# Patient Record
Sex: FEMALE | Race: WHITE | NOT HISPANIC OR LATINO | Employment: FULL TIME | URBAN - METROPOLITAN AREA
[De-identification: names, ages, dates, MRNs, and addresses within clinical notes are randomized per-mention and may not be internally consistent; named-entity substitution may affect disease eponyms.]

---

## 2023-07-27 RX ORDER — NORGESTIMATE AND ETHINYL ESTRADIOL 7DAYSX3 LO
1 KIT ORAL DAILY
Qty: 84 TABLET | Refills: 0 | OUTPATIENT
Start: 2023-07-27

## 2023-07-31 ENCOUNTER — TELEPHONE (OUTPATIENT)
Dept: PRIMARY CARE | Facility: CLINIC | Age: 48
End: 2023-07-31

## 2023-07-31 DIAGNOSIS — Z30.41 ENCOUNTER FOR SURVEILLANCE OF CONTRACEPTIVE PILLS: Primary | ICD-10-CM

## 2023-07-31 RX ORDER — NORGESTIMATE AND ETHINYL ESTRADIOL 7DAYSX3 28
1 KIT ORAL DAILY
Qty: 28 TABLET | Refills: 0 | Status: SHIPPED | OUTPATIENT
Start: 2023-07-31 | End: 2023-10-26 | Stop reason: ALTCHOICE

## 2023-07-31 NOTE — TELEPHONE ENCOUNTER
Pt is requesting refill of her birth control. I scheduled her for 8/28 for her physical because she was due. She will need at least a month to last her until then

## 2023-10-25 ENCOUNTER — TELEPHONE (OUTPATIENT)
Dept: PRIMARY CARE | Facility: CLINIC | Age: 48
End: 2023-10-25

## 2023-10-25 NOTE — TELEPHONE ENCOUNTER
Patient thinks she is going through menopause. She is having night sweats. Last period was Aug 25th.

## 2023-10-26 ENCOUNTER — OFFICE VISIT (OUTPATIENT)
Dept: PRIMARY CARE | Facility: CLINIC | Age: 48
End: 2023-10-26
Payer: COMMERCIAL

## 2023-10-26 VITALS
BODY MASS INDEX: 28.45 KG/M2 | SYSTOLIC BLOOD PRESSURE: 142 MMHG | TEMPERATURE: 97.7 F | HEIGHT: 66 IN | DIASTOLIC BLOOD PRESSURE: 89 MMHG | OXYGEN SATURATION: 98 % | WEIGHT: 177 LBS | HEART RATE: 109 BPM

## 2023-10-26 DIAGNOSIS — R23.2 VASOMOTOR FLUSHING: Primary | ICD-10-CM

## 2023-10-26 DIAGNOSIS — Z11.59 NEED FOR HEPATITIS C SCREENING TEST: ICD-10-CM

## 2023-10-26 DIAGNOSIS — Z00.00 HEALTHCARE MAINTENANCE: ICD-10-CM

## 2023-10-26 PROBLEM — E55.9 VITAMIN D DEFICIENCY: Status: ACTIVE | Noted: 2023-10-26

## 2023-10-26 PROCEDURE — 99214 OFFICE O/P EST MOD 30 MIN: CPT | Performed by: FAMILY MEDICINE

## 2023-10-26 PROCEDURE — 1036F TOBACCO NON-USER: CPT | Performed by: FAMILY MEDICINE

## 2023-10-26 ASSESSMENT — ENCOUNTER SYMPTOMS
FATIGUE: 0
UNEXPECTED WEIGHT CHANGE: 0
SHORTNESS OF BREATH: 0
RHINORRHEA: 0
ABDOMINAL PAIN: 0
COUGH: 0
PALPITATIONS: 0

## 2023-10-26 ASSESSMENT — LIFESTYLE VARIABLES: HOW OFTEN DO YOU HAVE A DRINK CONTAINING ALCOHOL: 2-4 TIMES A MONTH

## 2023-10-26 ASSESSMENT — PATIENT HEALTH QUESTIONNAIRE - PHQ9
1. LITTLE INTEREST OR PLEASURE IN DOING THINGS: NOT AT ALL
2. FEELING DOWN, DEPRESSED OR HOPELESS: NOT AT ALL
SUM OF ALL RESPONSES TO PHQ9 QUESTIONS 1 AND 2: 0

## 2023-10-26 ASSESSMENT — COLUMBIA-SUICIDE SEVERITY RATING SCALE - C-SSRS
2. HAVE YOU ACTUALLY HAD ANY THOUGHTS OF KILLING YOURSELF?: NO
6. HAVE YOU EVER DONE ANYTHING, STARTED TO DO ANYTHING, OR PREPARED TO DO ANYTHING TO END YOUR LIFE?: NO
1. IN THE PAST MONTH, HAVE YOU WISHED YOU WERE DEAD OR WISHED YOU COULD GO TO SLEEP AND NOT WAKE UP?: NO

## 2023-10-26 NOTE — ASSESSMENT & PLAN NOTE
Discussed options including herbal products like Estroven, antidepressants like paroxetine, or HRT.  Will check labs and F/U for her physical and Pap.

## 2023-10-26 NOTE — PROGRESS NOTES
"Subjective   Patient ID: Perlita Majano is a 48 y.o. female who presents for Menopause (Pt is here for menopause and left ear pain ).    Perlita is here because she has been having hot flushes and night sweats for about a month.  Last menses in August.  She was on OCPs but stopped them in August, had a light short period and nothing since.  She has had some achiness and stiffness.  She also developed a left earache last night.  No runny nose or congestion.  Very mild sore throat.  No cough or shortness of breath.        Review of Systems   Constitutional:  Negative for fatigue and unexpected weight change.   HENT:  Positive for ear pain. Negative for congestion and rhinorrhea.    Respiratory:  Negative for cough and shortness of breath.    Cardiovascular:  Negative for chest pain and palpitations.   Gastrointestinal:  Negative for abdominal pain.       Objective     /89   Pulse 109   Temp 36.5 °C (97.7 °F)   Ht 1.664 m (5' 5.5\")   Wt 80.3 kg (177 lb)   SpO2 98%   BMI 29.01 kg/m²     Physical Exam  Constitutional:       General: She is not in acute distress.  HENT:      Right Ear: Tympanic membrane normal.      Left Ear: Tympanic membrane normal.   Neck:      Thyroid: No thyromegaly.   Cardiovascular:      Rate and Rhythm: Normal rate and regular rhythm.      Heart sounds: No murmur heard.  Pulmonary:      Effort: No respiratory distress.      Breath sounds: Normal breath sounds.   Lymphadenopathy:      Cervical: No cervical adenopathy.   Neurological:      Mental Status: She is alert.             Assessment/Plan   Problem List Items Addressed This Visit       Vasomotor flushing - Primary    Current Assessment & Plan     Discussed options including herbal products like Estroven, antidepressants like paroxetine, or HRT.  Will check labs and F/U for her physical and Pap.         Relevant Orders    TSH with reflex to Free T4 if abnormal    FSH     Other Visit Diagnoses       Need for hepatitis C screening " test        Relevant Orders    Hepatitis C Antibody    Healthcare maintenance        Relevant Orders    Lipid Panel    Comprehensive Metabolic Panel

## 2023-10-27 ENCOUNTER — LAB (OUTPATIENT)
Dept: LAB | Facility: LAB | Age: 48
End: 2023-10-27
Payer: COMMERCIAL

## 2023-10-27 DIAGNOSIS — Z11.59 NEED FOR HEPATITIS C SCREENING TEST: ICD-10-CM

## 2023-10-27 DIAGNOSIS — Z00.00 HEALTHCARE MAINTENANCE: ICD-10-CM

## 2023-10-27 DIAGNOSIS — R23.2 VASOMOTOR FLUSHING: ICD-10-CM

## 2023-10-27 LAB
ALBUMIN SERPL BCP-MCNC: 4.5 G/DL (ref 3.4–5)
ALP SERPL-CCNC: 103 U/L (ref 33–110)
ALT SERPL W P-5'-P-CCNC: 49 U/L (ref 7–45)
ANION GAP SERPL CALC-SCNC: 17 MMOL/L (ref 10–20)
AST SERPL W P-5'-P-CCNC: 38 U/L (ref 9–39)
BILIRUB SERPL-MCNC: 2.3 MG/DL (ref 0–1.2)
BUN SERPL-MCNC: 16 MG/DL (ref 6–23)
CALCIUM SERPL-MCNC: 9.7 MG/DL (ref 8.6–10.6)
CHLORIDE SERPL-SCNC: 101 MMOL/L (ref 98–107)
CHOLEST SERPL-MCNC: 176 MG/DL (ref 0–199)
CHOLESTEROL/HDL RATIO: 2.6
CO2 SERPL-SCNC: 24 MMOL/L (ref 21–32)
CREAT SERPL-MCNC: 0.88 MG/DL (ref 0.5–1.05)
FSH SERPL-ACNC: 56.5 IU/L
GFR SERPL CREATININE-BSD FRML MDRD: 82 ML/MIN/1.73M*2
GLUCOSE SERPL-MCNC: 135 MG/DL (ref 74–99)
HCV AB SER QL: NONREACTIVE
HDLC SERPL-MCNC: 66.8 MG/DL
LDLC SERPL CALC-MCNC: 95 MG/DL
NON HDL CHOLESTEROL: 109 MG/DL (ref 0–149)
POTASSIUM SERPL-SCNC: 3.8 MMOL/L (ref 3.5–5.3)
PROT SERPL-MCNC: 7.9 G/DL (ref 6.4–8.2)
SODIUM SERPL-SCNC: 138 MMOL/L (ref 136–145)
TRIGL SERPL-MCNC: 73 MG/DL (ref 0–149)
TSH SERPL-ACNC: 1.05 MIU/L (ref 0.44–3.98)
VLDL: 15 MG/DL (ref 0–40)

## 2023-10-27 PROCEDURE — 86803 HEPATITIS C AB TEST: CPT

## 2023-10-27 PROCEDURE — 80061 LIPID PANEL: CPT

## 2023-10-27 PROCEDURE — 80053 COMPREHEN METABOLIC PANEL: CPT

## 2023-10-27 PROCEDURE — 83001 ASSAY OF GONADOTROPIN (FSH): CPT

## 2023-10-27 PROCEDURE — 36415 COLL VENOUS BLD VENIPUNCTURE: CPT

## 2023-10-27 PROCEDURE — 84443 ASSAY THYROID STIM HORMONE: CPT

## 2023-10-29 PROBLEM — G95.9 CERVICAL MYELOPATHY (MULTI): Status: ACTIVE | Noted: 2023-10-29

## 2023-10-29 PROBLEM — L91.8 SKIN TAGS, MULTIPLE ACQUIRED: Status: ACTIVE | Noted: 2023-10-29

## 2023-10-29 RX ORDER — MAGNESIUM 250 MG
1 TABLET ORAL DAILY
COMMUNITY

## 2023-10-29 RX ORDER — MULTIVITAMIN/IRON/FOLIC ACID 18MG-0.4MG
1 TABLET ORAL DAILY
COMMUNITY

## 2023-10-29 RX ORDER — FLUTICASONE PROPIONATE 50 MCG
1 SPRAY, SUSPENSION (ML) NASAL DAILY
COMMUNITY

## 2023-10-31 ENCOUNTER — OFFICE VISIT (OUTPATIENT)
Dept: PRIMARY CARE | Facility: CLINIC | Age: 48
End: 2023-10-31
Payer: COMMERCIAL

## 2023-10-31 VITALS
WEIGHT: 173 LBS | BODY MASS INDEX: 27.8 KG/M2 | HEART RATE: 82 BPM | DIASTOLIC BLOOD PRESSURE: 92 MMHG | SYSTOLIC BLOOD PRESSURE: 139 MMHG | OXYGEN SATURATION: 99 % | HEIGHT: 66 IN | TEMPERATURE: 97.1 F

## 2023-10-31 DIAGNOSIS — Z12.31 ENCOUNTER FOR SCREENING MAMMOGRAM FOR BREAST CANCER: ICD-10-CM

## 2023-10-31 DIAGNOSIS — Z00.00 HEALTHCARE MAINTENANCE: Primary | ICD-10-CM

## 2023-10-31 DIAGNOSIS — Z12.4 SCREENING FOR CERVICAL CANCER: ICD-10-CM

## 2023-10-31 DIAGNOSIS — R23.2 VASOMOTOR FLUSHING: ICD-10-CM

## 2023-10-31 PROBLEM — G95.9 CERVICAL MYELOPATHY (MULTI): Status: RESOLVED | Noted: 2023-10-29 | Resolved: 2023-10-31

## 2023-10-31 PROBLEM — L91.8 SKIN TAGS, MULTIPLE ACQUIRED: Status: RESOLVED | Noted: 2023-10-29 | Resolved: 2023-10-31

## 2023-10-31 PROBLEM — R73.01 IMPAIRED FASTING GLUCOSE: Status: ACTIVE | Noted: 2023-10-31

## 2023-10-31 PROCEDURE — 1036F TOBACCO NON-USER: CPT | Performed by: FAMILY MEDICINE

## 2023-10-31 PROCEDURE — 88175 CYTOPATH C/V AUTO FLUID REDO: CPT

## 2023-10-31 PROCEDURE — 87624 HPV HI-RISK TYP POOLED RSLT: CPT

## 2023-10-31 PROCEDURE — 99396 PREV VISIT EST AGE 40-64: CPT | Performed by: FAMILY MEDICINE

## 2023-10-31 RX ORDER — PAROXETINE 10 MG/1
10 TABLET, FILM COATED ORAL EVERY MORNING
Qty: 30 TABLET | Refills: 1 | Status: SHIPPED | OUTPATIENT
Start: 2023-10-31 | End: 2023-12-30

## 2023-10-31 ASSESSMENT — ENCOUNTER SYMPTOMS
FATIGUE: 0
MYALGIAS: 1
PALPITATIONS: 0
BLOOD IN STOOL: 0
DIARRHEA: 0
COUGH: 0
SLEEP DISTURBANCE: 1
WEAKNESS: 0
EYE PAIN: 0
FREQUENCY: 0
NUMBNESS: 0
CONSTIPATION: 0
ABDOMINAL PAIN: 0
NERVOUS/ANXIOUS: 0
VOMITING: 0
NAUSEA: 0
RHINORRHEA: 0
DYSPHORIC MOOD: 1
SORE THROAT: 1
DYSURIA: 0
UNEXPECTED WEIGHT CHANGE: 1
SHORTNESS OF BREATH: 0
BACK PAIN: 1
HEADACHES: 0
ARTHRALGIAS: 1
DIZZINESS: 0

## 2023-10-31 ASSESSMENT — PATIENT HEALTH QUESTIONNAIRE - PHQ9
SUM OF ALL RESPONSES TO PHQ QUESTIONS 1-9: 16
2. FEELING DOWN, DEPRESSED OR HOPELESS: MORE THAN HALF THE DAYS
8. MOVING OR SPEAKING SO SLOWLY THAT OTHER PEOPLE COULD HAVE NOTICED. OR THE OPPOSITE, BEING SO FIGETY OR RESTLESS THAT YOU HAVE BEEN MOVING AROUND A LOT MORE THAN USUAL: NEARLY EVERY DAY
SUM OF ALL RESPONSES TO PHQ9 QUESTIONS 1 & 2: 0
2. FEELING DOWN, DEPRESSED OR HOPELESS: NOT AT ALL
7. TROUBLE CONCENTRATING ON THINGS, SUCH AS READING THE NEWSPAPER OR WATCHING TELEVISION: NOT AT ALL
1. LITTLE INTEREST OR PLEASURE IN DOING THINGS: NOT AT ALL
4. FEELING TIRED OR HAVING LITTLE ENERGY: NEARLY EVERY DAY
5. POOR APPETITE OR OVEREATING: NEARLY EVERY DAY
SUM OF ALL RESPONSES TO PHQ9 QUESTIONS 1 & 2: 3
1. LITTLE INTEREST OR PLEASURE IN DOING THINGS: SEVERAL DAYS
9. THOUGHTS THAT YOU WOULD BE BETTER OFF DEAD, OR OF HURTING YOURSELF: NOT AT ALL
10. IF YOU CHECKED OFF ANY PROBLEMS, HOW DIFFICULT HAVE THESE PROBLEMS MADE IT FOR YOU TO DO YOUR WORK, TAKE CARE OF THINGS AT HOME, OR GET ALONG WITH OTHER PEOPLE: SOMEWHAT DIFFICULT
6. FEELING BAD ABOUT YOURSELF - OR THAT YOU ARE A FAILURE OR HAVE LET YOURSELF OR YOUR FAMILY DOWN: MORE THAN HALF THE DAYS
3. TROUBLE FALLING OR STAYING ASLEEP: MORE THAN HALF THE DAYS

## 2023-10-31 ASSESSMENT — COLUMBIA-SUICIDE SEVERITY RATING SCALE - C-SSRS
6. HAVE YOU EVER DONE ANYTHING, STARTED TO DO ANYTHING, OR PREPARED TO DO ANYTHING TO END YOUR LIFE?: NO
1. IN THE PAST MONTH, HAVE YOU WISHED YOU WERE DEAD OR WISHED YOU COULD GO TO SLEEP AND NOT WAKE UP?: NO
2. HAVE YOU ACTUALLY HAD ANY THOUGHTS OF KILLING YOURSELF?: NO

## 2023-10-31 NOTE — PATIENT INSTRUCTIONS
"Thank you for coming in to see me today, and congratulations on paying attention to staying healthy!    The single most important factor in staying healthy is eating a healthy diet.  Research has shown that the healthiest diet for humans is one rich in whole fresh plant foods.  This means most of what you eat should be fresh fruits and vegetables, whole grains, beans, nuts and seeds.  Adding meat, dairy foods and eggs does not make your food healthier (although it may make it taste better!) so you should work to minimize the amount of beef, chicken, pork, turkey, lamb, cheese and eggs you eat.  Fatty fish like salmon and tuna may be healthier alternatives.  If you would like more information please check out the website \"[place] [employer],\" and the books \"The Blue Zones\" by Cruz Londono and \"Engine 2 Diet\" by Christopher San.    I don't like recommending \"exercise\" because that has unpleasant connotations of pain and being out of breath for many people.  Instead, I encourage my patients to find a way to move your body that you LOVE and would want to do even if it were bad for you!  Playing a fun sport like pickleball, doing yoga or yanna chi, studying martial arts, learning to dance, even chasing your kids or grandkids around the backyard are great examples of activity that makes your heart healthier.  Remember, if you don't like it, don't do it!  There are plenty of fun options, pick one and try it out!  Aim for at least 30 minutes of activity that gets your heart revved up, most days per week.    We discussed some screening tests for you today, these tests are meant to find problems before they make you sick, when they are easier to treat and less likely to impact your health.  Depending on your age and stage of life they may include blood tests, a Pap test, a mammogram, a bone density test, a colonoscopy and others.  If you have questions later about these or other recommended tests please call and ask!    There are " a number of other things you can do to improve your health.  These may include things like   Increasing the amount of water you drink every day (aim for 1 oz of water for every 2 pounds of body weight, up to about 100 oz total)  Getting 7-8 hours of sleep every night  Avoiding smoking, drinking alcohol, and using recreational drugs    Stress management is also a very important component of staying healthy.  One of the most effective stress management tools is meditation.  I recommend everyone consider proper training in meditation - it isn't just sitting with your eyes closed and breathing.  You can find a training program in your area at [email or web address] or [email or web address].    An annual physical is a great measure to keep you as healthy as you possibly can be.  Good for you for getting that done!  See you next year :-)

## 2023-10-31 NOTE — PROGRESS NOTES
"Subjective   Patient ID: Perlita Majano is a 48 y.o. female who presents for Annual Exam (Comp / pap).    Perlita is here for her physical and Pap.    Diet:  Not great, has a poor appetite  Exercise:  Walking the dog, exercises with her son as well  Sleep:  Not great, has a hard time falling asleep  Stress:  Moderate  Smoking:  Never  EtOH:  Occasional    Profession:  Assistant to a /    Dentist:  Sees regularly  Eye doctor:  Sees regularly    Last Pap:  2018, normal with negative HPV  History of abnormal Pap:  Yes, remote, recent ones have been normal  Mammogram:  10/7/2022  Colorectal cancer screening:  Colonoscopy 10/7/2022, no polyps removed  DEXA scan:  Age 67  Vaccines due?  Needs flu    Sexually active:  Yes  Contraception:  Condoms    Menopause symptoms:  Yes, has hot flushes        Review of Systems   Constitutional:  Positive for unexpected weight change. Negative for fatigue.   HENT:  Positive for ear pain and sore throat. Negative for congestion and rhinorrhea.    Eyes:  Negative for pain and visual disturbance.   Respiratory:  Negative for cough and shortness of breath.    Cardiovascular:  Negative for chest pain and palpitations.   Gastrointestinal:  Negative for abdominal pain, blood in stool, constipation, diarrhea, nausea and vomiting.   Genitourinary:  Positive for menstrual problem (irregular). Negative for dysuria, frequency and vaginal discharge.   Musculoskeletal:  Positive for arthralgias, back pain and myalgias.   Skin:  Negative for rash.   Neurological:  Negative for dizziness, weakness, numbness and headaches.   Psychiatric/Behavioral:  Positive for dysphoric mood and sleep disturbance. The patient is not nervous/anxious.        Objective     BP (!) 139/92   Pulse 82   Temp 36.2 °C (97.1 °F)   Ht 1.664 m (5' 5.5\")   Wt 78.5 kg (173 lb)   SpO2 99%   BMI 28.35 kg/m²     Physical Exam  Constitutional:       General: She is not in acute " distress.  HENT:      Right Ear: Tympanic membrane normal.      Left Ear: Tympanic membrane normal.   Neck:      Thyroid: No thyromegaly.   Cardiovascular:      Rate and Rhythm: Normal rate and regular rhythm.      Heart sounds: No murmur heard.  Pulmonary:      Effort: No respiratory distress.      Breath sounds: Normal breath sounds.   Chest:   Breasts:     Breasts are symmetrical.      Right: No mass, nipple discharge, skin change or tenderness.      Left: No mass, nipple discharge, skin change or tenderness.   Abdominal:      General: Bowel sounds are normal. There is no distension.      Palpations: Abdomen is soft. There is no hepatomegaly or splenomegaly.      Tenderness: There is no abdominal tenderness.   Genitourinary:     General: Normal vulva.      Vagina: Normal.      Cervix: Normal.   Musculoskeletal:         General: No swelling or tenderness.   Lymphadenopathy:      Cervical: No cervical adenopathy.      Upper Body:      Right upper body: No axillary adenopathy.      Left upper body: No axillary adenopathy.   Skin:     General: Skin is warm and dry.      Coloration: Skin is not jaundiced.      Findings: No rash.   Neurological:      General: No focal deficit present.      Mental Status: She is alert and oriented to person, place, and time.   Psychiatric:         Mood and Affect: Mood is depressed. Affect is flat.         Behavior: Behavior normal.             Assessment/Plan   Problem List Items Addressed This Visit       Vasomotor flushing    Relevant Medications    PARoxetine (Paxil) 10 mg tablet     Other Visit Diagnoses       Healthcare maintenance    -  Primary    Screening for cervical cancer        Relevant Orders    THINPREP PAP TEST    Encounter for screening mammogram for breast cancer        Relevant Orders    BI mammo bilateral screening tomosynthesis

## 2023-11-14 LAB
CYTOLOGY CMNT CVX/VAG CYTO-IMP: NORMAL
HPV HR 12 DNA GENITAL QL NAA+PROBE: NEGATIVE
HPV HR GENOTYPES PNL CVX NAA+PROBE: NEGATIVE
HPV16 DNA SPEC QL NAA+PROBE: NEGATIVE
HPV18 DNA SPEC QL NAA+PROBE: NEGATIVE
LAB AP HPV GENOTYPE QUESTION: YES
LAB AP HPV HR: NORMAL
LABORATORY COMMENT REPORT: NORMAL
PATH REPORT.TOTAL CANCER: NORMAL

## 2023-11-27 ENCOUNTER — APPOINTMENT (OUTPATIENT)
Dept: PRIMARY CARE | Facility: CLINIC | Age: 48
End: 2023-11-27
Payer: COMMERCIAL

## 2024-10-08 ENCOUNTER — OFFICE VISIT (OUTPATIENT)
Dept: PRIMARY CARE | Facility: CLINIC | Age: 49
End: 2024-10-08
Payer: COMMERCIAL

## 2024-10-08 VITALS
HEART RATE: 81 BPM | OXYGEN SATURATION: 98 % | HEIGHT: 66 IN | TEMPERATURE: 98 F | SYSTOLIC BLOOD PRESSURE: 129 MMHG | DIASTOLIC BLOOD PRESSURE: 84 MMHG | BODY MASS INDEX: 30.41 KG/M2 | WEIGHT: 189.2 LBS

## 2024-10-08 DIAGNOSIS — Z12.31 ENCOUNTER FOR SCREENING MAMMOGRAM FOR BREAST CANCER: ICD-10-CM

## 2024-10-08 DIAGNOSIS — F32.1 CURRENT MODERATE EPISODE OF MAJOR DEPRESSIVE DISORDER, UNSPECIFIED WHETHER RECURRENT (MULTI): ICD-10-CM

## 2024-10-08 DIAGNOSIS — R73.01 IMPAIRED FASTING GLUCOSE: ICD-10-CM

## 2024-10-08 DIAGNOSIS — R63.5 WEIGHT GAIN: ICD-10-CM

## 2024-10-08 DIAGNOSIS — Z00.00 HEALTHCARE MAINTENANCE: Primary | ICD-10-CM

## 2024-10-08 PROCEDURE — 99396 PREV VISIT EST AGE 40-64: CPT | Performed by: FAMILY MEDICINE

## 2024-10-08 PROCEDURE — 3008F BODY MASS INDEX DOCD: CPT | Performed by: FAMILY MEDICINE

## 2024-10-08 PROCEDURE — 1036F TOBACCO NON-USER: CPT | Performed by: FAMILY MEDICINE

## 2024-10-08 RX ORDER — BUPROPION HYDROCHLORIDE 150 MG/1
150 TABLET ORAL EVERY MORNING
Qty: 30 TABLET | Refills: 1 | Status: SHIPPED | OUTPATIENT
Start: 2024-10-08 | End: 2024-12-07

## 2024-10-08 ASSESSMENT — ENCOUNTER SYMPTOMS
ARTHRALGIAS: 1
DIZZINESS: 0
FREQUENCY: 0
NERVOUS/ANXIOUS: 0
CONSTIPATION: 0
DYSURIA: 0
EYE PAIN: 0
UNEXPECTED WEIGHT CHANGE: 1
DIARRHEA: 0
FATIGUE: 0
RHINORRHEA: 0
NAUSEA: 0
COUGH: 0
VOMITING: 0
PALPITATIONS: 0
SORE THROAT: 0
HEADACHES: 1
WEAKNESS: 0
DYSPHORIC MOOD: 1
MYALGIAS: 1
NUMBNESS: 0
BACK PAIN: 1
SLEEP DISTURBANCE: 0
BLOOD IN STOOL: 0
SHORTNESS OF BREATH: 0
ABDOMINAL PAIN: 0

## 2024-10-08 ASSESSMENT — PATIENT HEALTH QUESTIONNAIRE - PHQ9
SUM OF ALL RESPONSES TO PHQ9 QUESTIONS 1 & 2: 2
10. IF YOU CHECKED OFF ANY PROBLEMS, HOW DIFFICULT HAVE THESE PROBLEMS MADE IT FOR YOU TO DO YOUR WORK, TAKE CARE OF THINGS AT HOME, OR GET ALONG WITH OTHER PEOPLE: NOT DIFFICULT AT ALL
2. FEELING DOWN, DEPRESSED OR HOPELESS: SEVERAL DAYS
1. LITTLE INTEREST OR PLEASURE IN DOING THINGS: SEVERAL DAYS

## 2024-10-08 NOTE — PATIENT INSTRUCTIONS
"Thank you for coming in to see me today, and congratulations on paying attention to staying healthy!    The single most important factor in staying healthy is eating a healthy diet.  Research has shown that the healthiest diet for humans is one rich in whole fresh plant foods.  This means most of what you eat should be fresh fruits and vegetables, whole grains, beans, nuts and seeds.  Adding meat, dairy foods and eggs does not make your food healthier (although it may make it taste better!) so you should work to minimize the amount of beef, chicken, pork, turkey, lamb, cheese and eggs you eat.  Fatty fish like salmon and tuna may be healthier alternatives.  If you would like more information please check out the website \"[place] over [employer],\" and the books \"The Blue Zones\" by Cruz Londono and \"Engine 2 Diet\" by Christopher San.    I don't like recommending \"exercise\" because that has unpleasant connotations of pain and being out of breath for many people.  Instead, I encourage my patients to find a way to move your body that you LOVE and would want to do even if it were bad for you!  Playing a fun sport like pickleball, doing yoga or yanna chi, studying martial arts, learning to dance, even chasing your kids or grandkids around the backyard are great examples of activity that makes your heart healthier.  Remember, if you don't like it, don't do it!  There are plenty of fun options, pick one and try it out!  Aim for at least 30 minutes of activity that gets your heart revved up, most days per week.    We discussed some screening tests for you today, these tests are meant to find problems before they make you sick, when they are easier to treat and less likely to impact your health.  Depending on your age and stage of life they may include blood tests, a Pap test, a mammogram, a bone density test, a colonoscopy and others.  I can also order a test called Galleri, which is a blood test to screen for cancer.  It is not yet " covered by insurance and costs about $800, but I'm happy to order it if you would like.  If you have questions later about these or other recommended tests please call and ask!    There are a number of other things you can do to improve your health.  These may include things like   Increasing the amount of water you drink every day (aim for 1 oz of water for every 2 pounds of body weight, up to about 100 oz total)  Getting 7-8 hours of sleep every night  Avoiding smoking, drinking alcohol, and using recreational drugs    Stress management is also a very important component of staying healthy.  One of the most effective stress management tools is meditation.  I recommend everyone consider proper training in meditation - it isn't just sitting with your eyes closed and breathing.  You can find a training program in your area at [email or web address] or [email or web address].    An annual physical is a great measure to keep you as healthy as you possibly can be.  Good for you for getting that done!  See you next year :-)

## 2024-10-08 NOTE — PROGRESS NOTES
"Subjective   Patient ID: Perlita Majano is a 49 y.o. female who presents for Annual Exam.    Perlita is here for her physical.  She is continuing to struggle with her mood, never tried the paroxetine from last year.    Diet:  Not great, has a poor appetite  Exercise:  Walking the dog, exercises with her son as well  Sleep:  OK  Stress:  Moderate  Smoking:  Never  EtOH:  Occasional    Profession:  Assistant to a /    Dentist:  Sees regularly  Eye doctor:  Sees regularly    Last Pap:  10/31/2023, normal with negative HPV  History of abnormal Pap:  Yes, remote, recent ones have been normal  Mammogram:  10/7/2022  Colorectal cancer screening:  Colonoscopy 10/7/2022, no polyps removed  DEXA scan:  Age 67  Vaccines due?  Needs flu    Sexually active:  Yes    Menopause symptoms:  No hot flushes or night sweats, LMP summer 2023  Urine leakage:  Yes, has SONIA          Review of Systems   Constitutional:  Positive for unexpected weight change (Up about 15 pounds in the last year). Negative for fatigue.   HENT:  Negative for congestion, ear pain, rhinorrhea and sore throat.    Eyes:  Negative for pain and visual disturbance.   Respiratory:  Negative for cough and shortness of breath.    Cardiovascular:  Negative for chest pain and palpitations.   Gastrointestinal:  Negative for abdominal pain, blood in stool, constipation, diarrhea, nausea and vomiting.   Genitourinary:  Negative for dysuria, frequency, vaginal bleeding and vaginal discharge.   Musculoskeletal:  Positive for arthralgias, back pain and myalgias.   Skin:  Negative for rash.   Neurological:  Positive for headaches. Negative for dizziness, weakness and numbness.   Psychiatric/Behavioral:  Positive for dysphoric mood. Negative for sleep disturbance. The patient is not nervous/anxious.        Objective     /84   Pulse 81   Temp 36.7 °C (98 °F)   Ht 1.664 m (5' 5.5\")   Wt 85.8 kg (189 lb 3.2 oz)   SpO2 98%   BMI 31.01 " kg/m²     Physical Exam  Constitutional:       General: She is not in acute distress.  HENT:      Right Ear: Tympanic membrane normal.      Left Ear: Tympanic membrane normal.   Neck:      Thyroid: No thyromegaly.   Cardiovascular:      Rate and Rhythm: Normal rate and regular rhythm.      Heart sounds: No murmur heard.  Pulmonary:      Effort: No respiratory distress.      Breath sounds: Normal breath sounds.   Chest:   Breasts:     Breasts are symmetrical.      Right: No mass, nipple discharge, skin change or tenderness.      Left: No mass, nipple discharge, skin change or tenderness.   Abdominal:      General: Bowel sounds are normal. There is no distension.      Palpations: Abdomen is soft. There is no hepatomegaly or splenomegaly.      Tenderness: There is no abdominal tenderness.   Musculoskeletal:         General: No swelling or tenderness.   Lymphadenopathy:      Cervical: No cervical adenopathy.      Upper Body:      Right upper body: No axillary adenopathy.      Left upper body: No axillary adenopathy.   Skin:     General: Skin is warm and dry.      Coloration: Skin is not jaundiced.      Findings: No rash.   Neurological:      General: No focal deficit present.      Mental Status: She is alert and oriented to person, place, and time.   Psychiatric:         Mood and Affect: Mood is depressed. Affect is flat.         Behavior: Behavior normal.             Assessment/Plan   Problem List Items Addressed This Visit       Impaired fasting glucose    Relevant Orders    Hemoglobin A1c     Other Visit Diagnoses       Healthcare maintenance    -  Primary    Relevant Orders    Comprehensive Metabolic Panel    Lipid Panel    Encounter for screening mammogram for breast cancer        Relevant Orders    BI mammo bilateral screening tomosynthesis    Weight gain        Relevant Orders    TSH with reflex to Free T4 if abnormal    Current moderate episode of major depressive disorder, unspecified whether recurrent (Multi)         Relevant Medications    buPROPion XL (Wellbutrin XL) 150 mg 24 hr tablet

## 2024-10-10 ENCOUNTER — LAB (OUTPATIENT)
Dept: LAB | Facility: LAB | Age: 49
End: 2024-10-10
Payer: COMMERCIAL

## 2024-10-10 DIAGNOSIS — Z00.00 HEALTHCARE MAINTENANCE: ICD-10-CM

## 2024-10-10 DIAGNOSIS — R73.01 IMPAIRED FASTING GLUCOSE: ICD-10-CM

## 2024-10-10 DIAGNOSIS — R63.5 WEIGHT GAIN: ICD-10-CM

## 2024-10-10 LAB
ALBUMIN SERPL BCP-MCNC: 4.6 G/DL (ref 3.4–5)
ALP SERPL-CCNC: 152 U/L (ref 33–110)
ALT SERPL W P-5'-P-CCNC: 28 U/L (ref 7–45)
ANION GAP SERPL CALC-SCNC: 14 MMOL/L (ref 10–20)
AST SERPL W P-5'-P-CCNC: 27 U/L (ref 9–39)
BILIRUB SERPL-MCNC: 2.2 MG/DL (ref 0–1.2)
BUN SERPL-MCNC: 17 MG/DL (ref 6–23)
CALCIUM SERPL-MCNC: 10.1 MG/DL (ref 8.6–10.6)
CHLORIDE SERPL-SCNC: 102 MMOL/L (ref 98–107)
CHOLEST SERPL-MCNC: 186 MG/DL (ref 0–199)
CHOLESTEROL/HDL RATIO: 3.6
CO2 SERPL-SCNC: 29 MMOL/L (ref 21–32)
CREAT SERPL-MCNC: 0.81 MG/DL (ref 0.5–1.05)
EGFRCR SERPLBLD CKD-EPI 2021: 90 ML/MIN/1.73M*2
EST. AVERAGE GLUCOSE BLD GHB EST-MCNC: 85 MG/DL
GLUCOSE SERPL-MCNC: 102 MG/DL (ref 74–99)
HBA1C MFR BLD: 4.6 %
HDLC SERPL-MCNC: 51.4 MG/DL
LDLC SERPL CALC-MCNC: 111 MG/DL
NON HDL CHOLESTEROL: 135 MG/DL (ref 0–149)
POTASSIUM SERPL-SCNC: 4.9 MMOL/L (ref 3.5–5.3)
PROT SERPL-MCNC: 8.2 G/DL (ref 6.4–8.2)
SODIUM SERPL-SCNC: 140 MMOL/L (ref 136–145)
TRIGL SERPL-MCNC: 120 MG/DL (ref 0–149)
TSH SERPL-ACNC: 1.34 MIU/L (ref 0.44–3.98)
VLDL: 24 MG/DL (ref 0–40)

## 2024-10-10 PROCEDURE — 36415 COLL VENOUS BLD VENIPUNCTURE: CPT

## 2024-10-10 PROCEDURE — 83036 HEMOGLOBIN GLYCOSYLATED A1C: CPT

## 2024-10-10 PROCEDURE — 80053 COMPREHEN METABOLIC PANEL: CPT

## 2024-10-10 PROCEDURE — 80061 LIPID PANEL: CPT

## 2024-10-10 PROCEDURE — 84443 ASSAY THYROID STIM HORMONE: CPT

## 2024-10-11 ENCOUNTER — HOSPITAL ENCOUNTER (OUTPATIENT)
Dept: RADIOLOGY | Facility: CLINIC | Age: 49
Discharge: HOME | End: 2024-10-11
Payer: COMMERCIAL

## 2024-10-11 VITALS — WEIGHT: 185 LBS | BODY MASS INDEX: 30.82 KG/M2 | HEIGHT: 65 IN

## 2024-10-11 DIAGNOSIS — Z12.31 ENCOUNTER FOR SCREENING MAMMOGRAM FOR BREAST CANCER: ICD-10-CM

## 2024-10-11 PROCEDURE — 77067 SCR MAMMO BI INCL CAD: CPT

## 2024-10-15 ENCOUNTER — TELEPHONE (OUTPATIENT)
Dept: PRIMARY CARE | Facility: CLINIC | Age: 49
End: 2024-10-15
Payer: COMMERCIAL

## 2024-10-15 NOTE — TELEPHONE ENCOUNTER
----- Message from Norma Kong sent at 10/15/2024  1:38 PM EDT -----  Labs reviewed, fasting glucose is a tiny bit high but she is not diabetic.  Cholesterol is good.  Thyroid is normal.

## 2024-10-17 ENCOUNTER — TELEPHONE (OUTPATIENT)
Dept: PRIMARY CARE | Facility: CLINIC | Age: 49
End: 2024-10-17

## 2024-10-17 NOTE — TELEPHONE ENCOUNTER
----- Message from Norma Kong sent at 10/17/2024  1:54 PM EDT -----  Normal mammogram, recheck 1 year.

## 2024-10-28 ENCOUNTER — APPOINTMENT (OUTPATIENT)
Dept: PRIMARY CARE | Facility: CLINIC | Age: 49
End: 2024-10-28
Payer: COMMERCIAL

## 2024-10-28 VITALS
TEMPERATURE: 97.9 F | WEIGHT: 179 LBS | DIASTOLIC BLOOD PRESSURE: 88 MMHG | BODY MASS INDEX: 29.82 KG/M2 | HEIGHT: 65 IN | SYSTOLIC BLOOD PRESSURE: 138 MMHG | HEART RATE: 75 BPM | OXYGEN SATURATION: 99 %

## 2024-10-28 DIAGNOSIS — F32.4 MAJOR DEPRESSIVE DISORDER IN PARTIAL REMISSION, UNSPECIFIED WHETHER RECURRENT (CMS-HCC): ICD-10-CM

## 2024-10-28 PROCEDURE — 99213 OFFICE O/P EST LOW 20 MIN: CPT | Performed by: FAMILY MEDICINE

## 2024-10-28 PROCEDURE — 1036F TOBACCO NON-USER: CPT | Performed by: FAMILY MEDICINE

## 2024-10-28 PROCEDURE — 3008F BODY MASS INDEX DOCD: CPT | Performed by: FAMILY MEDICINE

## 2024-10-28 RX ORDER — BUPROPION HYDROCHLORIDE 300 MG/1
300 TABLET ORAL EVERY MORNING
Qty: 30 TABLET | Refills: 1 | Status: SHIPPED | OUTPATIENT
Start: 2024-10-28 | End: 2024-12-27

## 2024-10-28 ASSESSMENT — PATIENT HEALTH QUESTIONNAIRE - PHQ9
2. FEELING DOWN, DEPRESSED OR HOPELESS: NOT AT ALL
1. LITTLE INTEREST OR PLEASURE IN DOING THINGS: NOT AT ALL
SUM OF ALL RESPONSES TO PHQ9 QUESTIONS 1 & 2: 0

## 2024-10-28 ASSESSMENT — ENCOUNTER SYMPTOMS
ABDOMINAL PAIN: 0
PALPITATIONS: 0
COUGH: 0
UNEXPECTED WEIGHT CHANGE: 0
FATIGUE: 0
SHORTNESS OF BREATH: 0

## 2024-12-03 ENCOUNTER — APPOINTMENT (OUTPATIENT)
Dept: PRIMARY CARE | Facility: CLINIC | Age: 49
End: 2024-12-03
Payer: COMMERCIAL

## 2024-12-03 DIAGNOSIS — F32.4 MAJOR DEPRESSIVE DISORDER IN PARTIAL REMISSION, UNSPECIFIED WHETHER RECURRENT (CMS-HCC): ICD-10-CM

## 2024-12-03 PROCEDURE — 99213 OFFICE O/P EST LOW 20 MIN: CPT | Performed by: FAMILY MEDICINE

## 2024-12-03 PROCEDURE — 1036F TOBACCO NON-USER: CPT | Performed by: FAMILY MEDICINE

## 2024-12-03 RX ORDER — BUPROPION HYDROCHLORIDE 300 MG/1
300 TABLET ORAL EVERY MORNING
Qty: 90 TABLET | Refills: 1 | Status: SHIPPED | OUTPATIENT
Start: 2024-12-03 | End: 2025-06-01

## 2024-12-03 ASSESSMENT — ENCOUNTER SYMPTOMS
UNEXPECTED WEIGHT CHANGE: 0
PALPITATIONS: 0
FATIGUE: 0
COUGH: 0
SHORTNESS OF BREATH: 0
ABDOMINAL PAIN: 0

## 2024-12-03 NOTE — PROGRESS NOTES
"Subjective   Patient ID: Perlita Majano is a 49 y.o. female who presents for No chief complaint on file..    Perlita is here to follow up on her mood.  She feels she is about 80% better.  She feels the \"dark thoughts\" are gone and she can think much better.  No side effects from the medication.  Sleep is OK.    An interactive audio and video telecommunication system which permits real time communications between the patient (at the originating site) and provider (at the distant site) was utilized to provide this telehealth service.   Verbal consent was requested and obtained from Perlita Majano on this date, 12/03/24 for a telehealth visit.           Review of Systems   Constitutional:  Negative for fatigue and unexpected weight change.   Respiratory:  Negative for cough and shortness of breath.    Cardiovascular:  Negative for chest pain and palpitations.   Gastrointestinal:  Negative for abdominal pain.       Objective     There were no vitals taken for this visit.    Physical Exam  Constitutional:       General: She is not in acute distress.  Neurological:      Mental Status: She is alert.   Psychiatric:         Mood and Affect: Mood and affect normal.         Speech: Speech normal.         Behavior: Behavior normal.      Comments: Affect is much brighter and more euthymic.             Assessment/Plan   Problem List Items Addressed This Visit       Major depressive disorder in partial remission (CMS-HCC)    Current Assessment & Plan     She is doing much better, will continue the Wellbutrin and F/U in 6 months.  Warned re early relapse with stopping the antidepressant too soon.         Relevant Medications    buPROPion XL (Wellbutrin XL) 300 mg 24 hr tablet             "

## 2024-12-03 NOTE — ASSESSMENT & PLAN NOTE
She is doing much better, will continue the Wellbutrin and F/U in 6 months.  Warned re early relapse with stopping the antidepressant too soon.

## 2025-05-28 ENCOUNTER — APPOINTMENT (OUTPATIENT)
Dept: PRIMARY CARE | Facility: CLINIC | Age: 50
End: 2025-05-28
Payer: COMMERCIAL

## 2025-05-28 VITALS
DIASTOLIC BLOOD PRESSURE: 74 MMHG | WEIGHT: 172 LBS | HEIGHT: 65 IN | SYSTOLIC BLOOD PRESSURE: 130 MMHG | BODY MASS INDEX: 28.66 KG/M2

## 2025-05-28 DIAGNOSIS — Z00.00 WELL ADULT EXAM: ICD-10-CM

## 2025-05-28 DIAGNOSIS — F32.4 MAJOR DEPRESSIVE DISORDER IN PARTIAL REMISSION, UNSPECIFIED WHETHER RECURRENT: ICD-10-CM

## 2025-05-28 DIAGNOSIS — Z12.31 BREAST CANCER SCREENING BY MAMMOGRAM: Primary | ICD-10-CM

## 2025-05-28 PROCEDURE — 1036F TOBACCO NON-USER: CPT | Performed by: STUDENT IN AN ORGANIZED HEALTH CARE EDUCATION/TRAINING PROGRAM

## 2025-05-28 PROCEDURE — 3008F BODY MASS INDEX DOCD: CPT | Performed by: STUDENT IN AN ORGANIZED HEALTH CARE EDUCATION/TRAINING PROGRAM

## 2025-05-28 PROCEDURE — 99204 OFFICE O/P NEW MOD 45 MIN: CPT | Performed by: STUDENT IN AN ORGANIZED HEALTH CARE EDUCATION/TRAINING PROGRAM

## 2025-05-28 RX ORDER — BUPROPION HYDROCHLORIDE 300 MG/1
300 TABLET ORAL EVERY MORNING
Qty: 90 TABLET | Refills: 3 | Status: SHIPPED | OUTPATIENT
Start: 2025-05-28 | End: 2026-05-28

## 2025-05-28 NOTE — PROGRESS NOTES
Subjective   Patient ID: Perlita Majano is a 50 y.o. female who presents for Establish Care.  HPI  Perlita is here to establish care.    She has been maintained on wellbutrin, mood is stable. No current depressive symptoms. No negative side effects. Sleeping well at night. Appetite is stable. Drinking water throughout the day. Walks dogs daily for exercise. Finding colby in a lot of things. Eating a balanced diet. Enjoying her work.    PMHx: MDD  SurgHx: None  FamHx: seasonal allergies  SocialHx: Never smoker. Never vaped. Drinking rare social EtOh 1-2 per month. Never drug use. Working in a [employer] firm. Lives at home with , Two sons (12, 14). Two dogs (retriever and great jignesh)    Review of Systems  12-point ROS was reviewed and is negative, unless otherwise noted in HPI    Objective   Vitals:    05/28/25 1536   BP: 130/74      Physical Exam  GEN: alert, conversant, NAD  HEENT: PERRL, EOMI, MMM, Tms pearly gray bilaterally  NECK: supple, no LAD appreciated  CHEST: CTAB  CV: S1, S2, RRR, no murmurs appreciated  ABD: soft, NT, ND  EXT: no significant LE edema  SKIN: warm, dry    Assessment/Plan   #well adult  - Counseled continued efforts on healthy lifestyle modification including balanced diet, and continued exercise for >5 minutes  - counseled age appropriate vaccines and preventative measures    #Depression, well controlled  Stable  - continue wellbutrin 300mg daily    #mitral valve prolapse  - faint murmur, stable    Health Maintenance:  Vaccines: COVID (x2), Flu (declines), TDAP (2021)  Screening: Colonoscopy (2022, repeat 2032), Mammogram (10/2024), Paptest (10/2023, repeat in 2028)  Labs: Reviewed recent, none needed today     RTC in 12 months, or sooner PRN    Dylan Spann,